# Patient Record
Sex: MALE
[De-identification: names, ages, dates, MRNs, and addresses within clinical notes are randomized per-mention and may not be internally consistent; named-entity substitution may affect disease eponyms.]

---

## 2017-12-01 PROCEDURE — 87070 CULTURE OTHR SPECIMN AEROBIC: CPT | Performed by: INTERNAL MEDICINE

## 2017-12-01 PROCEDURE — 87205 SMEAR GRAM STAIN: CPT | Performed by: INTERNAL MEDICINE

## 2017-12-01 PROCEDURE — 87186 SC STD MICRODIL/AGAR DIL: CPT | Performed by: INTERNAL MEDICINE

## 2017-12-01 PROCEDURE — 87147 CULTURE TYPE IMMUNOLOGIC: CPT | Performed by: INTERNAL MEDICINE

## 2017-12-06 ENCOUNTER — HOSPITAL (OUTPATIENT)
Dept: OTHER | Age: 82
End: 2017-12-06
Attending: INTERNAL MEDICINE

## 2017-12-06 LAB
ANALYZER ANC (IANC): ABNORMAL
ANION GAP SERPL CALC-SCNC: 15 MMOL/L (ref 10–20)
BASOPHILS # BLD: 0 THOUSAND/MCL (ref 0–0.3)
BASOPHILS NFR BLD: 0 %
BUN SERPL-MCNC: 35 MG/DL (ref 6–20)
BUN/CREAT SERPL: 26 (ref 7–25)
CALCIUM SERPL-MCNC: 8.7 MG/DL (ref 8.4–10.2)
CHLORIDE: 114 MMOL/L (ref 98–107)
CO2 SERPL-SCNC: 22 MMOL/L (ref 21–32)
CREAT SERPL-MCNC: 1.36 MG/DL (ref 0.67–1.17)
CRP SERPL-MCNC: <0.3 MG/DL
DIFFERENTIAL METHOD BLD: ABNORMAL
EOSINOPHIL # BLD: 0.3 THOUSAND/MCL (ref 0.1–0.5)
EOSINOPHIL NFR BLD: 5 %
ERYTHROCYTE [DISTWIDTH] IN BLOOD: 15.3 % (ref 11–15)
GLUCOSE SERPL-MCNC: 123 MG/DL (ref 65–99)
HEMATOCRIT: 40.7 % (ref 39–51)
HGB BLD-MCNC: 13.4 GM/DL (ref 13–17)
LYMPHOCYTES # BLD: 1 THOUSAND/MCL (ref 1–4)
LYMPHOCYTES NFR BLD: 14 %
MCH RBC QN AUTO: 31.1 PG (ref 26–34)
MCHC RBC AUTO-ENTMCNC: 32.9 GM/DL (ref 32–36.5)
MCV RBC AUTO: 94.4 FL (ref 78–100)
MONOCYTES # BLD: 0.5 THOUSAND/MCL (ref 0.3–0.9)
MONOCYTES NFR BLD: 7 %
NEUTROPHILS # BLD: 5.4 THOUSAND/MCL (ref 1.8–7.7)
NEUTROPHILS NFR BLD: 74 %
NEUTS SEG NFR BLD: ABNORMAL %
PERCENT NRBC: ABNORMAL
PLATELET # BLD: 135 THOUSAND/MCL (ref 140–450)
POTASSIUM SERPL-SCNC: 4.7 MMOL/L (ref 3.4–5.1)
RBC # BLD: 4.31 MILLION/MCL (ref 4.5–5.9)
SODIUM SERPL-SCNC: 146 MMOL/L (ref 135–145)
WBC # BLD: 7.3 THOUSAND/MCL (ref 4.2–11)

## 2017-12-07 ENCOUNTER — CHARTING TRANS (OUTPATIENT)
Dept: OTHER | Age: 82
End: 2017-12-07

## 2017-12-07 LAB
ALBUMIN SERPL-MCNC: 3.2 GM/DL (ref 3.6–5.1)
ALBUMIN/GLOB SERPL: 1.2 {RATIO} (ref 1–2.4)
ALP SERPL-CCNC: 123 UNIT/L (ref 45–117)
ALT SERPL-CCNC: 112 UNIT/L
ANALYZER ANC (IANC): ABNORMAL
ANION GAP SERPL CALC-SCNC: 14 MMOL/L (ref 10–20)
AST SERPL-CCNC: 49 UNIT/L
BASOPHILS # BLD: 0 THOUSAND/MCL (ref 0–0.3)
BASOPHILS NFR BLD: 0 %
BILIRUB SERPL-MCNC: 0.6 MG/DL (ref 0.2–1)
BUN SERPL-MCNC: 30 MG/DL (ref 6–20)
BUN/CREAT SERPL: 23 (ref 7–25)
CALCIUM SERPL-MCNC: 8.5 MG/DL (ref 8.4–10.2)
CHLORIDE: 112 MMOL/L (ref 98–107)
CO2 SERPL-SCNC: 23 MMOL/L (ref 21–32)
CREAT SERPL-MCNC: 1.29 MG/DL (ref 0.67–1.17)
DIFFERENTIAL METHOD BLD: ABNORMAL
EOSINOPHIL # BLD: 0.2 THOUSAND/MCL (ref 0.1–0.5)
EOSINOPHIL NFR BLD: 4 %
ERYTHROCYTE [DISTWIDTH] IN BLOOD: 15.6 % (ref 11–15)
GLOBULIN SER-MCNC: 2.7 GM/DL (ref 2–4)
GLUCOSE SERPL-MCNC: 120 MG/DL (ref 65–99)
HEMATOCRIT: 39.4 % (ref 39–51)
HGB BLD-MCNC: 12.9 GM/DL (ref 13–17)
LYMPHOCYTES # BLD: 0.9 THOUSAND/MCL (ref 1–4)
LYMPHOCYTES NFR BLD: 16 %
MCH RBC QN AUTO: 31.2 PG (ref 26–34)
MCHC RBC AUTO-ENTMCNC: 32.7 GM/DL (ref 32–36.5)
MCV RBC AUTO: 95.2 FL (ref 78–100)
MONOCYTES # BLD: 0.6 THOUSAND/MCL (ref 0.3–0.9)
MONOCYTES NFR BLD: 10 %
NEUTROPHILS # BLD: 3.8 THOUSAND/MCL (ref 1.8–7.7)
NEUTROPHILS NFR BLD: 70 %
NEUTS SEG NFR BLD: ABNORMAL %
PERCENT NRBC: ABNORMAL
PLATELET # BLD: 127 THOUSAND/MCL (ref 140–450)
POTASSIUM SERPL-SCNC: 4.8 MMOL/L (ref 3.4–5.1)
PROT SERPL-MCNC: 5.9 GM/DL (ref 6.4–8.2)
RBC # BLD: 4.14 MILLION/MCL (ref 4.5–5.9)
SODIUM SERPL-SCNC: 144 MMOL/L (ref 135–145)
WBC # BLD: 5.5 THOUSAND/MCL (ref 4.2–11)

## 2017-12-20 ENCOUNTER — LAB ENCOUNTER (OUTPATIENT)
Dept: LAB | Age: 82
End: 2017-12-20
Attending: INTERNAL MEDICINE
Payer: MEDICARE

## 2017-12-20 DIAGNOSIS — L97.919 CHRONIC ULCER OF LOWER EXTREMITY, RIGHT, WITH UNSPECIFIED SEVERITY (HCC): ICD-10-CM

## 2017-12-20 DIAGNOSIS — L03.115 CELLULITIS OF RIGHT LEG: ICD-10-CM

## 2017-12-20 DIAGNOSIS — R60.0 LEG EDEMA: ICD-10-CM

## 2017-12-20 PROCEDURE — 87147 CULTURE TYPE IMMUNOLOGIC: CPT

## 2017-12-20 PROCEDURE — 87070 CULTURE OTHR SPECIMN AEROBIC: CPT

## 2017-12-20 PROCEDURE — 87075 CULTR BACTERIA EXCEPT BLOOD: CPT

## 2017-12-20 PROCEDURE — 87186 SC STD MICRODIL/AGAR DIL: CPT

## 2017-12-20 PROCEDURE — 87077 CULTURE AEROBIC IDENTIFY: CPT

## 2017-12-20 PROCEDURE — 87205 SMEAR GRAM STAIN: CPT

## 2017-12-27 PROBLEM — R60.0 EDEMA OF BOTH LEGS: Status: ACTIVE | Noted: 2017-12-27

## 2017-12-27 PROBLEM — L97.812 SKIN ULCER OF RIGHT PRETIBIAL REGION WITH FAT LAYER EXPOSED (HCC): Status: ACTIVE | Noted: 2017-12-27

## 2017-12-29 PROCEDURE — 81001 URINALYSIS AUTO W/SCOPE: CPT | Performed by: INTERNAL MEDICINE

## 2017-12-29 PROCEDURE — 87086 URINE CULTURE/COLONY COUNT: CPT | Performed by: INTERNAL MEDICINE

## 2017-12-29 PROCEDURE — 82607 VITAMIN B-12: CPT | Performed by: INTERNAL MEDICINE

## 2017-12-29 PROCEDURE — 82746 ASSAY OF FOLIC ACID SERUM: CPT | Performed by: INTERNAL MEDICINE

## 2018-01-08 PROBLEM — I48.91 ATRIAL FIBRILLATION (HCC): Status: ACTIVE | Noted: 2018-01-08

## 2018-01-10 PROBLEM — I25.10 CORONARY ARTERY DISEASE INVOLVING NATIVE CORONARY ARTERY OF NATIVE HEART WITHOUT ANGINA PECTORIS: Status: ACTIVE | Noted: 2018-01-10

## 2018-01-10 PROBLEM — Z79.01 ANTICOAGULATED ON COUMADIN: Status: ACTIVE | Noted: 2018-01-10

## 2018-01-10 PROBLEM — R94.30 CARDIAC LV EJECTION FRACTION >40%: Status: ACTIVE | Noted: 2018-01-10

## 2018-01-10 PROBLEM — E78.49 OTHER HYPERLIPIDEMIA: Status: ACTIVE | Noted: 2018-01-10

## 2018-01-10 PROBLEM — I34.0 NON-RHEUMATIC MITRAL REGURGITATION: Status: ACTIVE | Noted: 2018-01-10

## 2018-01-10 PROBLEM — Z86.79 HISTORY OF AORTIC ANEURYSM REPAIR: Status: ACTIVE | Noted: 2018-01-10

## 2018-01-10 PROBLEM — I50.43 ACUTE ON CHRONIC COMBINED SYSTOLIC AND DIASTOLIC CHF, NYHA CLASS 3 (HCC): Status: ACTIVE | Noted: 2018-01-10

## 2018-01-10 PROBLEM — Z98.890 HISTORY OF AORTIC ANEURYSM REPAIR: Status: ACTIVE | Noted: 2018-01-10

## 2018-01-10 PROBLEM — I77.810 DILATED AORTIC ROOT (HCC): Status: ACTIVE | Noted: 2018-01-10

## 2018-02-06 PROBLEM — Z79.899 ENCOUNTER FOR MONITORING DIGOXIN THERAPY: Status: ACTIVE | Noted: 2018-02-06

## 2018-02-06 PROBLEM — Z51.81 ENCOUNTER FOR MONITORING DIGOXIN THERAPY: Status: ACTIVE | Noted: 2018-02-06

## 2019-01-01 ENCOUNTER — APPOINTMENT (OUTPATIENT)
Dept: WOUND CARE | Facility: HOSPITAL | Age: 84
End: 2019-01-01
Payer: MEDICARE

## 2019-01-01 ENCOUNTER — APPOINTMENT (OUTPATIENT)
Dept: WOUND CARE | Facility: HOSPITAL | Age: 84
End: 2019-01-01
Attending: NURSE PRACTITIONER
Payer: MEDICARE

## 2019-01-01 ENCOUNTER — HOSPITAL ENCOUNTER (EMERGENCY)
Facility: HOSPITAL | Age: 84
Discharge: HOME | End: 2019-01-01
Attending: EMERGENCY MEDICINE
Payer: MEDICARE

## 2019-01-01 ENCOUNTER — APPOINTMENT (OUTPATIENT)
Dept: GENERAL RADIOLOGY | Facility: HOSPITAL | Age: 84
End: 2019-01-01
Attending: EMERGENCY MEDICINE
Payer: MEDICARE

## 2019-01-01 VITALS
WEIGHT: 200 LBS | OXYGEN SATURATION: 94 % | BODY MASS INDEX: 30.31 KG/M2 | HEART RATE: 98 BPM | HEIGHT: 68 IN | TEMPERATURE: 98 F | DIASTOLIC BLOOD PRESSURE: 93 MMHG | RESPIRATION RATE: 21 BRPM | SYSTOLIC BLOOD PRESSURE: 124 MMHG

## 2019-01-01 DIAGNOSIS — IMO0001 CHRONIC VENOUS HYPERTENSION WITH ULCER INVOLVING LEFT SIDE: ICD-10-CM

## 2019-01-01 DIAGNOSIS — IMO0001 CHRONIC VENOUS HYPERTENSION WITH ULCER INVOLVING RIGHT SIDE: Primary | ICD-10-CM

## 2019-01-01 DIAGNOSIS — L97.919 CHRONIC VENOUS HYPERTENSION W ULCER OF R LOW EXTREM (HCC): Primary | ICD-10-CM

## 2019-01-01 DIAGNOSIS — I87.311 CHRONIC VENOUS HYPERTENSION W ULCER OF R LOW EXTREM (HCC): Primary | ICD-10-CM

## 2019-01-01 DIAGNOSIS — IMO0001 CHRONIC VENOUS HYPERTENSION WITH ULCER INVOLVING LEFT SIDE: Primary | ICD-10-CM

## 2019-01-01 DIAGNOSIS — I87.2 VENOUS INSUFFICIENCY: Primary | ICD-10-CM

## 2019-01-01 DIAGNOSIS — IMO0001 CHRONIC VENOUS HYPERTENSION WITH ULCER INVOLVING RIGHT SIDE: ICD-10-CM

## 2019-01-01 DIAGNOSIS — S81.801D LEG WOUND, RIGHT, SUBSEQUENT ENCOUNTER: ICD-10-CM

## 2019-01-01 DIAGNOSIS — R06.00 DYSPNEA, UNSPECIFIED TYPE: Primary | ICD-10-CM

## 2019-01-01 DIAGNOSIS — I87.322 CHRONIC VENOUS HYPERTENSION WITH INFLAMMATION INVOLVING LEFT SIDE: ICD-10-CM

## 2019-01-01 DIAGNOSIS — L97.812 SKIN ULCER OF RIGHT PRETIBIAL REGION WITH FAT LAYER EXPOSED (HCC): ICD-10-CM

## 2019-01-01 DIAGNOSIS — S81.802D LEG WOUND, LEFT, SUBSEQUENT ENCOUNTER: ICD-10-CM

## 2019-01-01 LAB
ANION GAP SERPL CALC-SCNC: 7 MMOL/L (ref 0–18)
BASOPHILS # BLD AUTO: 0.01 X10(3) UL (ref 0–0.2)
BASOPHILS NFR BLD AUTO: 0.2 %
BUN BLD-MCNC: 51 MG/DL (ref 7–18)
BUN/CREAT SERPL: 27.4 (ref 10–20)
CALCIUM BLD-MCNC: 8.6 MG/DL (ref 8.5–10.1)
CHLORIDE SERPL-SCNC: 111 MMOL/L (ref 98–107)
CO2 SERPL-SCNC: 24 MMOL/L (ref 21–32)
CREAT BLD-MCNC: 1.86 MG/DL (ref 0.7–1.3)
DEPRECATED RDW RBC AUTO: 54.4 FL (ref 35.1–46.3)
EOSINOPHIL # BLD AUTO: 0.2 X10(3) UL (ref 0–0.7)
EOSINOPHIL NFR BLD AUTO: 3 %
ERYTHROCYTE [DISTWIDTH] IN BLOOD BY AUTOMATED COUNT: 15.8 % (ref 11–15)
GLUCOSE BLD-MCNC: 119 MG/DL (ref 70–99)
HCT VFR BLD AUTO: 40.7 % (ref 39–53)
HGB BLD-MCNC: 13 G/DL (ref 13–17.5)
IMM GRANULOCYTES # BLD AUTO: 0.02 X10(3) UL (ref 0–1)
IMM GRANULOCYTES NFR BLD: 0.3 %
INR BLD: 2.71 (ref 0.9–1.2)
LYMPHOCYTES # BLD AUTO: 0.53 X10(3) UL (ref 1–4)
LYMPHOCYTES NFR BLD AUTO: 8 %
MCH RBC QN AUTO: 30.2 PG (ref 26–34)
MCHC RBC AUTO-ENTMCNC: 31.9 G/DL (ref 31–37)
MCV RBC AUTO: 94.7 FL (ref 80–100)
MONOCYTES # BLD AUTO: 0.62 X10(3) UL (ref 0.1–1)
MONOCYTES NFR BLD AUTO: 9.4 %
NEUTROPHILS # BLD AUTO: 5.21 X10 (3) UL (ref 1.5–7.7)
NEUTROPHILS # BLD AUTO: 5.21 X10(3) UL (ref 1.5–7.7)
NEUTROPHILS NFR BLD AUTO: 79.1 %
NT-PROBNP SERPL-MCNC: 5703 PG/ML (ref ?–450)
OSMOLALITY SERPL CALC.SUM OF ELEC: 309 MOSM/KG (ref 275–295)
PLATELET # BLD AUTO: 89 10(3)UL (ref 150–450)
POTASSIUM SERPL-SCNC: 4.4 MMOL/L (ref 3.5–5.1)
PROTHROMBIN TIME: 29.2 SECONDS (ref 11.8–14.5)
RBC # BLD AUTO: 4.3 X10(6)UL (ref 3.8–5.8)
SODIUM SERPL-SCNC: 142 MMOL/L (ref 136–145)
TROPONIN I SERPL-MCNC: <0.045 NG/ML (ref ?–0.04)
WBC # BLD AUTO: 6.6 X10(3) UL (ref 4–11)

## 2019-01-01 PROCEDURE — 83880 ASSAY OF NATRIURETIC PEPTIDE: CPT

## 2019-01-01 PROCEDURE — 99212 OFFICE O/P EST SF 10 MIN: CPT

## 2019-01-01 PROCEDURE — 29581 APPL MULTLAYER CMPRN SYS LEG: CPT

## 2019-01-01 PROCEDURE — 99213 OFFICE O/P EST LOW 20 MIN: CPT

## 2019-01-01 PROCEDURE — 84484 ASSAY OF TROPONIN QUANT: CPT

## 2019-01-01 PROCEDURE — 29581 APPL MULTLAYER CMPRN SYS LEG: CPT | Performed by: NURSE PRACTITIONER

## 2019-01-01 PROCEDURE — 93005 ELECTROCARDIOGRAM TRACING: CPT

## 2019-01-01 PROCEDURE — 85025 COMPLETE CBC W/AUTO DIFF WBC: CPT

## 2019-01-01 PROCEDURE — 36415 COLL VENOUS BLD VENIPUNCTURE: CPT

## 2019-01-01 PROCEDURE — 81003 URINALYSIS AUTO W/O SCOPE: CPT | Performed by: INTERNAL MEDICINE

## 2019-01-01 PROCEDURE — 80048 BASIC METABOLIC PNL TOTAL CA: CPT | Performed by: EMERGENCY MEDICINE

## 2019-01-01 PROCEDURE — 99215 OFFICE O/P EST HI 40 MIN: CPT

## 2019-01-01 PROCEDURE — 71045 X-RAY EXAM CHEST 1 VIEW: CPT | Performed by: EMERGENCY MEDICINE

## 2019-01-01 PROCEDURE — 84156 ASSAY OF PROTEIN URINE: CPT | Performed by: INTERNAL MEDICINE

## 2019-01-01 PROCEDURE — 84484 ASSAY OF TROPONIN QUANT: CPT | Performed by: EMERGENCY MEDICINE

## 2019-01-01 PROCEDURE — 83970 ASSAY OF PARATHORMONE: CPT | Performed by: INTERNAL MEDICINE

## 2019-01-01 PROCEDURE — 80048 BASIC METABOLIC PNL TOTAL CA: CPT

## 2019-01-01 PROCEDURE — 85610 PROTHROMBIN TIME: CPT | Performed by: EMERGENCY MEDICINE

## 2019-01-01 PROCEDURE — 82565 ASSAY OF CREATININE: CPT | Performed by: INTERNAL MEDICINE

## 2019-01-01 PROCEDURE — 99285 EMERGENCY DEPT VISIT HI MDM: CPT

## 2019-01-01 PROCEDURE — 84100 ASSAY OF PHOSPHORUS: CPT | Performed by: INTERNAL MEDICINE

## 2019-01-01 PROCEDURE — 93010 ELECTROCARDIOGRAM REPORT: CPT | Performed by: EMERGENCY MEDICINE

## 2019-01-01 PROCEDURE — 85025 COMPLETE CBC W/AUTO DIFF WBC: CPT | Performed by: EMERGENCY MEDICINE

## 2019-01-01 PROCEDURE — 82570 ASSAY OF URINE CREATININE: CPT | Performed by: INTERNAL MEDICINE

## 2019-01-01 PROCEDURE — 82310 ASSAY OF CALCIUM: CPT | Performed by: INTERNAL MEDICINE

## 2019-01-01 PROCEDURE — 83880 ASSAY OF NATRIURETIC PEPTIDE: CPT | Performed by: EMERGENCY MEDICINE

## 2019-01-01 PROCEDURE — 82043 UR ALBUMIN QUANTITATIVE: CPT | Performed by: INTERNAL MEDICINE

## 2019-02-07 PROBLEM — IMO0001 CHRONIC VENOUS HYPERTENSION WITH ULCER INVOLVING LEFT SIDE: Status: ACTIVE | Noted: 2019-01-01

## 2019-02-07 PROBLEM — IMO0001 CHRONIC VENOUS HYPERTENSION WITH ULCER INVOLVING RIGHT SIDE: Status: ACTIVE | Noted: 2019-01-01

## 2019-02-07 NOTE — PROGRESS NOTES
Subjective    Chief Complaint  This information was obtained from the patient  The patient is new to the [address] [facility],[address] here for an initial visit for the evaluation and management of non-healing wound(s).     Allergies  Norco (Reaction: hallucination)    HPI L orchiectomy (2015)  cystoscopy (2013)  inguinal hernia repair (2008)  L TKR  CABG x3  cardiac valve surgery  bilateral cataracts  transcatheter aortic valve replacement    Complaints and Symptoms  This information was obtained from the patient  Patient c BLEs significant venous insufficiency and lipodermatosclerosis/scar tissue formation, with both legs wounds detail in the wound section.      Wound #1 Right, Distal, Anterior Lower Leg is a Full Thickness Venous Ulcer and has received a status of Not Healed Wound #3 Right, Proximal, Anterior Lower Leg is a Full Thickness Venous Ulcer and has received a status of Not Healed.  Initial wound encounter measurements are 4.8cm length x 1.6cm width x 0.1cm depth, with an area of 7.68 sq cm and a volume of 0.768 cubic The periwound skin exhibited: Edema, Moist, Atrophie Edgard, Hemosiderosis. The temperature of the periwound skin is WNL. Periwound skin does not exhibit signs or symptoms of infection.  Local Pulse is Normal.    Wound #6 Left, Medial, Anterior Lower Leg i Extremity Color: Pigmented  Hair Growth on Extremity: No  Temperature of Extremity: Warm  Capillary Refill: < 3 seconds  Lipodermatosclerosis: Yes    General Notes:  1/7/2019: Right YVES 1.26. The great toe pressure is at 101mmHg. Left YVES 1.30.  The great Wound #1 (Venous Ulcer) is located on the right, distal, anterior lower leg. A Multi Layer Compression Wrap procedure was performed for the lower right extremity by Kirstin Madrid RN. Celena Babb was applied with .  The procedure was tolerated wel Avoid prolonged standing in one place. Elevate leg(s) as much as possible. - higher than your heart three times a day for thirty min  Pro 4\"  Artiflex 10 cm  Artiflex 15 cm  Comprilan 8 cm. Comprilan 10 cm.      Wound #4 Right, Medial Lower Leg Discussed the plan of care at the bedside with the patient. Reviewed hospital records. I agree and attest to the above information provided from other licensed professionals. Follow-Up Appointments:  A follow-up appointment should be scheduled. Signed By: Regina Stack 02/07/2019 11:37:03 AM  Todd Godinez FNP-BC 02/07/2019 12:54:12 PM       Entered By: Gabi Hui on 02/07/2019 11:26:02 AM          Header Image    Multi Layer Compression Wrap Details  Patient Name: Devora Means   Anavelma

## 2019-02-14 NOTE — PROGRESS NOTES
Subjective    Chief Complaint  This information was obtained from the patient  The patient returns today for follow up and management of BLE Venous Wounds.      Allergies  Norco (Reaction: hallucination)    HPI  This information was obtained from the patien BLE varicose veins, with significant hemosiderin staining, edema has been managed by compression therapy. Musculoskeletal:  no clubbing, no cyanosis. Integumentary (Hair, Skin)  right and left leg wound. no edema, no induration.      Wound #1 Right, Wound #3 Right, Proximal, Anterior Lower Leg is a Full Thickness Venous Ulcer and has received a status of Not Healed.  Subsequent wound encounter measurements are 4.4cm length x 1cm width x 0.1cm depth, with an area of 4.4 sq cm and a volume of 0.44 cubic Wound #5 Left, Anterior Lower Leg is a Full Thickness Venous Ulcer and has received a status of Not Healed. Subsequent wound encounter measurements are 5.6cm length x 2.9cm width x 0.1cm depth, with an area of 16.24 sq cm and a volume of 1.624 cubic cm.  Kyle Negron Calf Measurement 30 cm from medial heel with left measurement of 34 cm  Ankle Measurement 10 cm from medial heel with left measurement of 22 cm  Foot Measurement 12 cm from great toe with left measurement of 24 cm  Right Extremity: Edema is present  Compre Wound #5 increase granulation in the wound bed, most of the adherent yellow black eschar has been removed from the wound bed by autolytic debridements and use of medihoney, wound bed is 21% smaller from the first visit 2/7/2018  left anterior lower leg wou Change dressing two times per week. Compression Therapy:  Avoid prolonged standing in one place. Elevate leg(s) as much as possible.  - higher than your heart three times a day for thirty min  Pro 4\"  Artiflex 10 cm  Artiflex 15 cm  Kalan Hydrofera ready  Change dressing two times per week. Compression Therapy:  Avoid prolonged standing in one place. Elevate leg(s) as much as possible.  - higher than your heart three times a day for thirty min  Pro 4\"  Artiflex 10 cm  Artiflex Post Procedural Pain: 0  Response to Treatment: Procedure was tolerated well  Compression Layers: Multi-Layer  Compression Product Type: Comprilan  Extremity Location: Lower Left Extremity    Electronic Signature(s)  Signed By: Sunitha Ordoñez 02/14/201

## 2019-02-19 NOTE — PROGRESS NOTES
Nurse Visit          Treatment Notes  Wound #1 (Right, Distal, Anterior Lower Leg)  . Wound Treatment Note  Assessed patient’s pain status and effectiveness of pain management plan.   Limb cleansed using Betasept and water (1), Soap and water (1)  Cleansed w Compression used: using Artiflex 10 cm (1), Artiflex 15 cm (1), Comprilan 08 cm (1), Comprilan 10 cm (1)    Wound #4 (Right, Medial Lower Leg)  . Wound Treatment Note  Assessed patient’s pain status and effectiveness of pain management plan.   Limb cleansed Compression used: using Artiflex 10 cm (1), Artiflex 15 cm (1), Comprilan 08 cm (1), Comprilan 10 cm (1)    Wound #6 (Left, Medial, Anterior Lower Leg)  . Wound Treatment Note  Assessed patient’s pain status and effectiveness of pain management plan.   Limb Multi Layer Compression Wrap Details  Patient Name: Ken Perez   Patient Number: [id number]  Patient YOB: 1931  Date: 2/19/2019  RN: Axel Lazo CNA/LAKISHA/CMA: Chery Acosta  Physician / Extender: Edu Hdez  Procedure Performed for: Ivory Melo

## 2019-02-20 PROBLEM — D69.6 THROMBOCYTOPENIA (HCC): Status: ACTIVE | Noted: 2019-01-01

## 2019-02-20 PROBLEM — I77.1 TORTUOUS AORTA (HCC): Status: ACTIVE | Noted: 2019-01-01

## 2019-02-20 PROBLEM — I71.2 THORACIC AORTIC ANEURYSM (HCC): Status: ACTIVE | Noted: 2019-01-01

## 2019-02-20 PROBLEM — N18.30 CKD (CHRONIC KIDNEY DISEASE) STAGE 3, GFR 30-59 ML/MIN (HCC): Status: ACTIVE | Noted: 2019-01-01

## 2019-02-20 PROBLEM — I74.10 THROMBUS OF AORTA (HCC): Status: ACTIVE | Noted: 2019-01-01

## 2019-02-20 PROBLEM — I70.0 AORTIC ATHEROSCLEROSIS (HCC): Status: ACTIVE | Noted: 2019-01-01

## 2019-02-20 PROBLEM — I77.9 ARTERIAL DISEASE (HCC): Status: ACTIVE | Noted: 2019-01-01

## 2019-02-22 NOTE — PROGRESS NOTES
Subjective    Chief Complaint  This information was obtained from the patient  The patient returns today for follow up and management of BLE Venous Wounds.      Allergies  Norco (Reaction: hallucination)    HPI  This information was obtained from the patien well developed, no acute distress. Height/Length: 69 in (175.26 cm), Weight: 204 lbs (92.73 kgs), BMI: 30.1, Temperature: 96.9 °F (36.06 °C), Pulse: 92 bpm, Respiratory Rate: 17 breaths/min, Blood Pressure: 111/70 mmHg, Pulse Oximetry: 95 %.      Respirator Wound #3 Right, Proximal, Anterior Lower Leg is a Full Thickness Venous Ulcer and has received a status of Not Healed.  Subsequent wound encounter measurements are 1.3cm length x 0.3cm width x 0.1cm depth, with an area of 0.39 sq cm and a volume of 0.039 cu Wound #5 Left, Anterior Lower Leg is a Full Thickness Venous Ulcer and has received a status of Not Healed. Subsequent wound encounter measurements are 5.6cm length x 3cm width x 0.2cm depth, with an area of 16.8 sq cm and a volume of 3.36 cubic cm.  There (Encounter Diagnosis) I87.312 - Chronic venous hypertension (idiopathic) with ulcer of left lower extremity    Wound #1 granular wound bed, this wound area reduction by 66% since first visit 2/7/2019.   Wound #3 granular wound bed, this wound area reduction Clean wound with Normal Saline or Wound Cleanser. Clean wound with soap and water. Antibiotic ointment / cream. Cover with dry gauze. Hydrofera ready  Dry gauze  Other: - zinc oxide to periwound  Change dressing two times per week.     Compression Ther Fredy Vasquez FNP-BC 02/22/2019 12:53:55 PM       Entered By: Fredy Vasquez on 02/22/2019 12:52:45 PM

## 2019-02-25 NOTE — PROGRESS NOTES
Nurse Visit          Treatment Notes  Wound #1 (Right, Distal, Anterior Lower Leg)  . Wound Treatment Note  Assessed patient’s pain status and effectiveness of pain management plan.   Limb cleansed using Betasept and water (1), Soap and water (1)  Cleansed w Applied topical product to marquis-wound area avoiding wound base. using Zinc Paste (1)  Applied Primary Wound Dressing.  using Hydrofera ready 4x5 (1)  Dressing secured with non-allergenic tape/stockinet/wrap. using Kerlix (1), Silk tape (1)  Applied Compress Stockinette applied using 4\" (1)  Compression applied to: using Toe bases to tibial tubercle (1)  Compression used: using Artiflex 10 cm (1), Artiflex 15 cm (1), Comprilan 08 cm (1), Comprilan 10 cm (2)  Notes  total 2 Hydrofera 4x5      Electronic Signat

## 2019-02-28 NOTE — PROGRESS NOTES
Subjective    Chief Complaint  This information was obtained from the patient  The patient returns today for follow up and management of BLE Venous Wounds.      Allergies  Norco (Reaction: hallucination)    HPI  This information was obtained from the patien well developed, no acute distress. Height/Length: 69 in (175.26 cm), Weight: 204 lbs (92.73 kgs), BMI: 30.1, Temperature: 96 °F (35.56 °C), Pulse: 86 bpm, Respiratory Rate: 16 breaths/min, Blood Pressure: 132/92 mmHg, Pulse Oximetry: 95 %.      Respiratory: The periwound skin exhibited: Hemosiderosis.  The periwound skin did not exhibit: Brawny Induration, Edema, Excoriation, Induration, Callus, Crepitus, Fluctuance, Friable, Rash, Dry/Scaly, Moist, Maceration, Atrophie Magy, Cyanosis, Ecchymosis, Erythema, The periwound skin exhibited: Moist, Atrophie Magy, Hemosiderosis.  The periwound skin did not exhibit: Brawny Induration, Edema, Excoriation, Induration, Callus, Crepitus, Fluctuance, Friable, Rash, Dry/Scaly, Maceration, Cyanosis, Ecchymosis, Erythema, Foot Measurement 12 cm from great toe with right measurement of 23 cm  Vascular Assessment:  Left Extremity colors, hair growth, and conditions:  Extremity Color: Pigmented  Hair Growth on Extremity: No  Temperature of Extremity: Warm  Capillary Refill: < Change dressing two times per week. Compression Therapy:  Avoid prolonged standing in one place. Elevate leg(s) as much as possible.  - higher than your heart three times a day for thirty min  Pro 4\"  Artiflex 10 cm  Artiflex 15 cm  Kalan Additional Orders: Follow-Up Appointments  Return Appointment: - twice a week    Misc / Additional orders  Supplement with a daily multivitamin. Increase dietary protein intake. Exercise as tolerated. Decrease salt intake. Stop smoking.    Moistu

## 2019-03-04 NOTE — PROGRESS NOTES
Nurse Visit            Treatment Notes  Wound #1 (Right, Distal, Anterior Lower Leg)  . Wound Treatment Note  Assessed patient’s pain status and effectiveness of pain management plan.   Limb cleansed using Betasept and water (1), Soap and water (1)  Cleansed Cleansed wound and periwound with non-cytotoxic agent. Applied topical product to marquis-wound area avoiding wound base. using Zinc Paste (1)  Applied topical agent to base of wound bed. using Mupirocin ointment (1)  Applied Primary Wound Dressing.  using Hy Procedure Performed for: Wound #1 Right, Distal, Anterior Lower Leg  Performed By: Clinician Axel Lazo RN  Procedural Pain: 0  Post Procedural Pain: 0  Response to Treatment: Procedure was tolerated well  Compression Layers: Multi-Layer  Compression Pro

## 2019-03-07 NOTE — PROGRESS NOTES
Subjective    Chief Complaint  This information was obtained from the patient  The patient returns today for follow up and management of BLE Venous Wounds.      Allergies  Norco (Reaction: hallucination)    HPI  This information was obtained from the patien bilateral leg wounds. no edema, no induration, no erythema. Wound #1 Right, Distal, Anterior Lower Leg is a Full Thickness Venous Ulcer and has received a status of Not Healed.  Subsequent wound encounter measurements are 1cm length x 1.5cm width x 0.1c Wound #5 Left, Anterior Lower Leg is a Full Thickness Venous Ulcer and has received a status of Not Healed. Subsequent wound encounter measurements are 5.1cm length x 2.4cm width x 0.1cm depth, with an area of 12.24 sq cm and a volume of 1.224 cubic cm.  No Diagnoses    ICD-10  I87.311: Chronic venous hypertension (idiopathic) with ulcer of right lower extremity  I87.312: Chronic venous hypertension (idiopathic) with ulcer of left lower extremity        Right lower ext wound improving reduced in size 91% sinc Clean wound with soap and water. Antibiotic ointment / cream. Cover with dry gauze. Hydrofera ready  Dry gauze  Other: - zinc oxide to periwound  Change dressing two times per week. Compression Therapy:  Avoid prolonged standing in one place.    Elev Armand Oliveros FNP-BC 03/07/2019 3:24:23 PM     3/7/2019 2:23:59 PM Version Signed By: Date:  Armand Oliveros 3/7/2019 3:00:45 PM    Entered By: Armand Oliveros on 03/07/2019 3:23:49 PM          Header Image    Multi Layer Compression Wrap Details  Patient Name

## 2019-03-11 NOTE — PROGRESS NOTES
Subjective    Chief Complaint  This information was obtained from the patient  The patient returns today for follow up and management of BLE Venous Wounds.      Allergies  Norco (Reaction: hallucination)    HPI  This information was obtained from the patien Height/Length: 69 in (175.26 cm), Weight: 204 lbs (92.73 kgs), BMI: 30.1, Temperature: 97.0 °F (36.11 °C), Pulse: 82 bpm, Respiratory Rate: 16 breaths/min, Blood Pressure: 112/77 mmHg, Pulse Oximetry: 95 %.      Respiratory:  Cough occasional, decreased air or symptoms of infection. Psychiatric:  Appropriate judgement and insight. Oriented to time, place and person. Appropriate mood and affect.         Assessment    Active Problems    ICD-10  (Encounter Diagnosis) I87.311 - Chronic venous hypertension (idio gauze.   Hydrofera ready  Dry gauze  Other: - zinc oxide to periwound  Change dressing two times per week. Compression Therapy:  Avoid prolonged standing in one place. Elevate leg(s) as much as possible.  - higher than your heart three times a day for By: Sunitha Ordoñez 03/11/2019 3:39:54 PM  Armand PERESP-BC 03/11/2019 3:42:31 PM       Entered By: Jose Johnson on 03/11/2019 3:36:30 PM          Header Image    Multi Layer Compression Wrap Details  Patient Name: Sophia Jacques   Patient Number:

## 2019-03-11 NOTE — ED INITIAL ASSESSMENT (HPI)
Pt reports sob for the past 2 weeks with swelling to bilateral legs. Pt denies cp.  Pt denies cough or fever

## 2019-03-11 NOTE — ED PROVIDER NOTES
Patient Seen in: [facility] AND [facility] Emergency Department    History   Patient presents with:  Dyspnea COCO SOB (respiratory)    Stated Complaint: SOB    HPI    Patient presents with his wife.   He had gone to the wound care clinic and mention to the nurse t • Unspecified essential hypertension        Past Surgical History:   Procedure Laterality Date   • CABG      CABG x3   • CARDIAC VALVE SURGERY     • CATARACT Bilateral    • CATH TRANSCATHETER AORTIC VALVE REPLACEMENT     • HERNIA REPAIR INGUINAL ADULT [employer]  Constitutional: no fever  HEENT: No cough, no congestion  Cardiovascular: no chest pain  Gastrointestinal: no abdominal pain, no nausea, no vomiting      Positive for stated complaint: SOB  Other systems are as noted in HPI.   Constitutional and vi diuretic. He seems to be doing fairly well he is actually quite active it sounds like considering his ongoing medical issues and problems. I discussed to return if worse they are comfortable with plan.     ED Course     Labs Reviewed   BASIC METABOLIC PAN follow up care with a primary care provider within the next three months to obtain basic health screening including reassessment of your blood pressure.       Clinical Impression:  Dyspnea, unspecified type  (primary encounter diagnosis)    Disposition:  Shankar Whalen

## 2019-03-11 NOTE — ED NOTES
The patient is cleared for discharge per Emergency Department physician. Discharge instructions were reviewed with patient including when and how to follow up with healthcare providers and when to seek emergency care. Peripheral IV discontinued.  The so

## 2019-03-18 NOTE — PROGRESS NOTES
Subjective    Chief Complaint  This information was obtained from the patient  The patient returns today for follow up and management of BLE Venous Wounds.      Allergies  Norco (Reaction: hallucination)    HPI  This information was obtained from the patien Musculoskeletal:  no clubbing, no cyanosis. Integumentary (Hair, Skin)  BLEs hemosiderin staining with left leg wound. no edema, no induration, no erythema.      Wound #1 Right, Distal, Anterior Lower Leg is a Full Thickness Venous Ulcer and has rec place and person. Appropriate mood and affect.     Assessment    Active Problems    ICD-10  (Encounter Diagnosis) I87.311 - Chronic venous hypertension (idiopathic) with ulcer of right lower extremity  (Encounter Diagnosis) I87.312 - Chronic venous hyperten ointment / cream. Cover with dry gauze. Hydrofera ready  Dry gauze  Other: - zinc oxide to periwound  Change dressing two times per week. Compression Therapy:  Avoid prolonged standing in one place. Elevate leg(s) as much as possible.  - higher than Entered By: Izabel Weinberg on 03/18/2019 4:06:56 PM

## 2019-03-21 NOTE — PROGRESS NOTES
Nurse Visit        Treatment Notes  Wound #1 (Right, Distal, Anterior Lower Leg)  . Wound Treatment Note  Assessed patient’s pain status and effectiveness of pain management plan.   Limb cleansed using Betasept and water (1), Soap and water (1)  Cleansed wou Facility: Outpatient    Notes  Noted 3+ edema to RLE and new blisters to RLE. Orders received from GIRMA Pate to apply multi-layer compression to RLE. Discussed with pt who verbalized understanding.   Electronic Signature(s)  Signed By: Apolonia Sanderson

## 2019-03-28 NOTE — PROGRESS NOTES
Subjective    Chief Complaint  This information was obtained from the patient  The patient returns today for follow up and management of BLE Venous Wounds.  3/28/19: No additional concerns today    Allergies  Norco (Reaction: hallucination)    HPI  This inf Height/Length: 69 in (175.26 cm), Weight: 204 lbs (92.73 kgs), BMI: 30.1, Temperature: 96.7 °F (35.94 °C), Pulse: 90 bpm, Respiratory Rate: 17 breaths/min, Blood Pressure: 108/77 mmHg, Pulse Oximetry: 94 %. Respiratory:  Respiration regular.      Integu The periwound skin exhibited: Edema, Moist, Hemosiderosis.  The periwound skin did not exhibit: Brawny Induration, Excoriation, Induration, Callus, Crepitus, Fluctuance, Friable, Rash, Dry/Scaly, Maceration, Atrophie Magy, Cyanosis, Ecchymosis, Erythema, Pro 4\"  Artiflex 10 cm  Artiflex 15 cm  Comprilan 6 cm  Comprilan 8 cm. Comprilan 10 cm. Wound #5 Left, Anterior Lower Leg     Wound Cleansing & Dressings  Clean wound with Normal Saline or Wound Cleanser. Clean wound with soap and water. Patient YOB: 1931  Date: 3/28/2019  RN: Hola Martinez CNA/MELLYT/CMA:  Nel Nation  Physician / Extender: Ajit Jackson  Procedure Performed for: Wound #5 Left, Anterior Lower Leg  Performed By: Clinician Hola Martinez, RN  Procedural Pain: 0

## 2019-04-04 PROBLEM — I87.311 CHRONIC VENOUS HYPERTENSION W ULCER OF R LOW EXTREM (HCC): Status: ACTIVE | Noted: 2019-01-01

## 2019-04-04 PROBLEM — L97.919 CHRONIC VENOUS HYPERTENSION W ULCER OF R LOW EXTREM (HCC): Status: ACTIVE | Noted: 2019-01-01

## 2019-04-04 PROBLEM — L97.919 IDIOPATHIC CHRONIC VENOUS HYPERTENSION OF RIGHT LOWER EXTREMITY WITH ULCER (HCC): Status: ACTIVE | Noted: 2019-01-01

## 2019-04-04 PROBLEM — I87.322 CHRONIC VENOUS HYPERTENSION WITH INFLAMMATION INVOLVING LEFT SIDE: Status: ACTIVE | Noted: 2019-01-01

## 2019-04-04 PROBLEM — I87.311 IDIOPATHIC CHRONIC VENOUS HYPERTENSION OF RIGHT LOWER EXTREMITY WITH ULCER (HCC): Status: ACTIVE | Noted: 2019-01-01

## 2019-04-04 NOTE — PROGRESS NOTES
Subjective    Chief Complaint  This information was obtained from the patient  The patient returns today for follow up and management of BLE Venous Wounds.      Allergies  Norco (Reaction: hallucination)    HPI  This information was obtained from the patien Constitutional  well developed, no acute distress.  Height/Length: 69 in (175.26 cm), Weight: 204 lbs (92.73 kgs), BMI: 30.1, Temperature: 97.1 °F (36.17 °C), Pulse: 79 bpm, Respiratory Rate: 17 breaths/min, Blood Pressure: 108/68 mmHg, Pulse Oximetry: 94 % The periwound skin exhibited: Edema, Moist, Hemosiderosis.  The periwound skin did not exhibit: Brawny Induration, Excoriation, Induration, Callus, Crepitus, Fluctuance, Friable, Rash, Dry/Scaly, Maceration, Atrophie Magy, Cyanosis, Ecchymosis, Erythema, Calf Measurement 38 cm from medial heel with right measurement of 36.8 cm  Ankle Measurement 10 cm from medial heel with right measurement of 22 cm  Foot Measurement 12 cm from great toe with right measurement of 23 cm  Vascular Assessment:  Left Extremity Wound #5 (Venous Ulcer) is located on the left, anterior lower leg. A Multi Layer Compression Wrap procedure was performed for the lower left extremity by Imani Schneider RN. Chaplin Cotton was applied with .  The procedure was tolerated well with letty - Educate patient to get up slowly from lying to sitting / pause / dangle / pause / ambulate. Status: Continued Date: 4/4/2019  - Use assistive devices in good operating condition as needed to enhance mobility.   Status: Continued Date: 4/4/2019  Patient /

## 2019-04-16 NOTE — PROGRESS NOTES
Subjective    Chief Complaint  This information was obtained from the patient  The patient returns today for follow up and management of BLE Venous Wounds.      Allergies  Norco (Reaction: hallucination)    HPI  This information was obtained from the patien Respiratory:  effortless breathing. Cardiovascular:  BLE varicose veins, with significant hemosiderin staining, edema has been managed by compression stocking. Musculoskeletal:  no clubbing, no cyanosis.      Integumentary (Hair, Skin)  left leg wou The periwound skin exhibited: Moist, Erythema.  The periwound skin did not exhibit: Brawny Induration, Edema, Excoriation, Induration, Callus, Crepitus, Fluctuance, Friable, Rash, Dry/Scaly, Maceration, Atrophie Amgy, Cyanosis, Ecchymosis, Hemosiderosis,

## 2019-04-18 NOTE — PROGRESS NOTES
Subjective    Chief Complaint  This information was obtained from the patient  The patient returns today for follow up and management of BLE Venous Wounds.      Allergies  Norco (Reaction: hallucination)    HPI  This information was obtained from the patien Integumentary (Hair/Skin/Nails): Dryness, Calluses/Corns, Lesions        Objective    Constitutional  well developed, no acute distress.  Height/Length: 69 in (175.26 cm), Weight: 204 lbs (92.73 kgs), BMI: 30.1, Temperature: 97.7 °F (36.5 °C), Pulse: 92 bpm Re-educated patient regarding the life long need for management of edema and daily inspection of the legs. Plan    Additional Orders: Follow-Up Appointments  Discharge from the wound care clinic. Compression Therapy:  Compression to right leg.   C

## 2019-05-30 ENCOUNTER — DIAGNOSTIC TRANS (OUTPATIENT)
Dept: OTHER | Age: 84
End: 2019-05-30

## 2019-05-30 ENCOUNTER — HOSPITAL (OUTPATIENT)
Dept: OTHER | Age: 84
End: 2019-05-30

## 2019-05-31 ENCOUNTER — HOSPITAL (OUTPATIENT)
Dept: OTHER | Age: 84
End: 2019-05-31

## 2019-06-01 LAB
ALBUMIN SERPL-MCNC: 3.1 G/DL (ref 3.6–5.1)
ALBUMIN/GLOB SERPL: 1 {RATIO} (ref 1–2.4)
ALP SERPL-CCNC: 179 UNITS/L (ref 45–117)
ALT SERPL-CCNC: 44 UNITS/L
ANALYZER ANC (IANC): ABNORMAL
ANION GAP SERPL CALC-SCNC: 9 MMOL/L (ref 10–20)
AST SERPL-CCNC: 37 UNITS/L
BASOPHILS # BLD: 0 K/MCL (ref 0–0.3)
BASOPHILS NFR BLD: 0 %
BILIRUB SERPL-MCNC: 1.4 MG/DL (ref 0.2–1)
BUN SERPL-MCNC: 60 MG/DL (ref 6–20)
BUN/CREAT SERPL: 42 (ref 7–25)
CALCIUM SERPL-MCNC: 9.2 MG/DL (ref 8.4–10.2)
CHLORIDE SERPL-SCNC: 108 MMOL/L (ref 98–107)
CO2 SERPL-SCNC: 29 MMOL/L (ref 21–32)
CREAT SERPL-MCNC: 1.42 MG/DL (ref 0.67–1.17)
DIFFERENTIAL METHOD BLD: ABNORMAL
EOSINOPHIL # BLD: 0 K/MCL (ref 0.1–0.5)
EOSINOPHIL NFR BLD: 0 %
ERYTHROCYTE [DISTWIDTH] IN BLOOD: 16.3 % (ref 11–15)
GLOBULIN SER-MCNC: 3.2 G/DL (ref 2–4)
GLUCOSE SERPL-MCNC: 150 MG/DL (ref 65–99)
HCT VFR BLD CALC: 39.9 % (ref 39–51)
HGB BLD-MCNC: 12.8 G/DL (ref 13–17)
IMM GRANULOCYTES # BLD AUTO: 0.1 K/MCL (ref 0–0.2)
IMM GRANULOCYTES NFR BLD: 1 %
INR PPP: 3
INR PPP: ABNORMAL
LYMPHOCYTES # BLD: 0.7 K/MCL (ref 1–4)
LYMPHOCYTES NFR BLD: 5 %
MCH RBC QN AUTO: 29.8 PG (ref 26–34)
MCHC RBC AUTO-ENTMCNC: 32.1 G/DL (ref 32–36.5)
MCV RBC AUTO: 92.8 FL (ref 78–100)
MONOCYTES # BLD: 0.8 K/MCL (ref 0.3–0.9)
MONOCYTES NFR BLD: 6 %
NEUTROPHILS # BLD: 12.2 K/MCL (ref 1.8–7.7)
NEUTROPHILS NFR BLD: 88 %
NEUTS SEG NFR BLD: ABNORMAL %
NRBC (NRBCRE): 0 /100 WBC
PLATELET # BLD: 112 K/MCL (ref 140–450)
POTASSIUM SERPL-SCNC: 3.7 MMOL/L (ref 3.4–5.1)
PROCALCITONIN SERPL IA-MCNC: 0.11 NG/ML
PROCALCITONIN SERPL IA-MCNC: ABNORMAL NG/ML
PROT SERPL-MCNC: 6.3 G/DL (ref 6.4–8.2)
PROTHROMBIN TIME (PRT2): ABNORMAL
PROTHROMBIN TIME: 28.9 SEC (ref 9.7–11.8)
RBC # BLD: 4.3 MIL/MCL (ref 4.5–5.9)
SODIUM SERPL-SCNC: 142 MMOL/L (ref 135–145)
WBC # BLD: 13.8 K/MCL (ref 4.2–11)

## 2019-06-04 LAB
BACTERIA BLD CULT: NORMAL

## 2019-06-07 LAB
BACTERIA BLD CULT: NORMAL

## (undated) NOTE — ED AVS SNAPSHOT
Valentin Armenta   MRN: [id number]    Department:  [facility] Emergency Department   Date of Visit:  3/11/2019           Disclosure     Insurance plans vary and the physician(s) referred by the ER may not be covered by your plan.  Please contact within the next three months to obtain basic health screening including reassessment of your blood pressure.     IF THERE IS ANY CHANGE OR WORSENING OF YOUR CONDITION, CALL YOUR PRIMARY CARE PHYSICIAN AT ONCE OR RETURN IMMEDIATELY TO THE EMERGENCY DEPARTMEN